# Patient Record
Sex: MALE | Race: WHITE
[De-identification: names, ages, dates, MRNs, and addresses within clinical notes are randomized per-mention and may not be internally consistent; named-entity substitution may affect disease eponyms.]

---

## 2020-07-09 ENCOUNTER — HOSPITAL ENCOUNTER (INPATIENT)
Dept: HOSPITAL 84 - D.ER | Age: 60
LOS: 3 days | Discharge: HOME | DRG: 871 | End: 2020-07-12
Attending: FAMILY MEDICINE | Admitting: FAMILY MEDICINE
Payer: COMMERCIAL

## 2020-07-09 VITALS
WEIGHT: 209.69 LBS | BODY MASS INDEX: 31.06 KG/M2 | WEIGHT: 209.69 LBS | BODY MASS INDEX: 31.06 KG/M2 | HEIGHT: 69 IN | HEIGHT: 69 IN

## 2020-07-09 VITALS — SYSTOLIC BLOOD PRESSURE: 108 MMHG | DIASTOLIC BLOOD PRESSURE: 65 MMHG

## 2020-07-09 VITALS — DIASTOLIC BLOOD PRESSURE: 60 MMHG | SYSTOLIC BLOOD PRESSURE: 110 MMHG

## 2020-07-09 VITALS — DIASTOLIC BLOOD PRESSURE: 74 MMHG | SYSTOLIC BLOOD PRESSURE: 123 MMHG

## 2020-07-09 VITALS — SYSTOLIC BLOOD PRESSURE: 106 MMHG | DIASTOLIC BLOOD PRESSURE: 56 MMHG

## 2020-07-09 VITALS — SYSTOLIC BLOOD PRESSURE: 105 MMHG | DIASTOLIC BLOOD PRESSURE: 87 MMHG

## 2020-07-09 VITALS — DIASTOLIC BLOOD PRESSURE: 85 MMHG | SYSTOLIC BLOOD PRESSURE: 120 MMHG

## 2020-07-09 DIAGNOSIS — K21.9: ICD-10-CM

## 2020-07-09 DIAGNOSIS — D50.9: ICD-10-CM

## 2020-07-09 DIAGNOSIS — A41.9: Primary | ICD-10-CM

## 2020-07-09 DIAGNOSIS — G93.41: ICD-10-CM

## 2020-07-09 DIAGNOSIS — E03.9: ICD-10-CM

## 2020-07-09 DIAGNOSIS — E87.6: ICD-10-CM

## 2020-07-09 DIAGNOSIS — D69.6: ICD-10-CM

## 2020-07-09 DIAGNOSIS — N39.0: ICD-10-CM

## 2020-07-09 DIAGNOSIS — J96.01: ICD-10-CM

## 2020-07-09 DIAGNOSIS — N40.0: ICD-10-CM

## 2020-07-09 DIAGNOSIS — R65.21: ICD-10-CM

## 2020-07-09 DIAGNOSIS — N17.0: ICD-10-CM

## 2020-07-09 DIAGNOSIS — E87.1: ICD-10-CM

## 2020-07-09 LAB
ALBUMIN SERPL-MCNC: 3 G/DL (ref 3.4–5)
ALP SERPL-CCNC: 58 U/L (ref 30–120)
ALT SERPL-CCNC: 89 U/L (ref 10–68)
ANION GAP SERPL CALC-SCNC: 12.6 MMOL/L (ref 8–16)
APTT BLD: 34.2 SECONDS (ref 22.8–39.4)
BACTERIA #/AREA URNS HPF: (no result) /HPF
BASOPHILS NFR BLD AUTO: 0.6 % (ref 0–2)
BILIRUB SERPL-MCNC: 0.6 MG/DL (ref 0.2–1.3)
BILIRUB SERPL-MCNC: NEGATIVE MG/DL
BUN SERPL-MCNC: 29 MG/DL (ref 7–18)
CALCIUM SERPL-MCNC: 8.3 MG/DL (ref 8.5–10.1)
CHLORIDE SERPL-SCNC: 91 MMOL/L (ref 98–107)
CK MB SERPL-MCNC: 2.6 U/L (ref 0–3.6)
CK SERPL-CCNC: 638 UL (ref 21–232)
CO2 SERPL-SCNC: 25.5 MMOL/L (ref 21–32)
CREAT SERPL-MCNC: 2 MG/DL (ref 0.6–1.3)
EOSINOPHIL NFR BLD: 0 % (ref 0–7)
ERYTHROCYTE [DISTWIDTH] IN BLOOD BY AUTOMATED COUNT: 12.8 % (ref 11.5–14.5)
ERYTHROCYTE [DISTWIDTH] IN BLOOD BY AUTOMATED COUNT: 12.9 % (ref 11.5–14.5)
GLOBULIN SER-MCNC: 3.5 G/L
GLUCOSE SERPL-MCNC: 113 MG/DL (ref 74–106)
GLUCOSE SERPL-MCNC: NEGATIVE MG/DL
GRAN CASTS #/AREA URNS LPF: (no result) /LPF
HCT VFR BLD CALC: 33.9 % (ref 42–54)
HCT VFR BLD CALC: 38.4 % (ref 42–54)
HGB BLD-MCNC: 11.9 G/DL (ref 13.5–17.5)
HGB BLD-MCNC: 13.4 G/DL (ref 13.5–17.5)
HYALINE CASTS #/AREA URNS LPF: (no result) /LPF
IMM GRANULOCYTES NFR BLD: 0.8 % (ref 0–5)
INR PPP: 1 (ref 0.85–1.17)
KETONES UR STRIP-MCNC: NEGATIVE MG/DL
LYMPHOCYTES NFR BLD AUTO: 1 % (ref 15–50)
LYMPHOCYTES NFR BLD AUTO: 7.7 % (ref 15–50)
MCH RBC QN AUTO: 31.2 PG (ref 26–34)
MCH RBC QN AUTO: 31.3 PG (ref 26–34)
MCHC RBC AUTO-ENTMCNC: 34.9 G/DL (ref 31–37)
MCHC RBC AUTO-ENTMCNC: 35.1 G/DL (ref 31–37)
MCV RBC: 89.2 FL (ref 80–100)
MCV RBC: 89.5 FL (ref 80–100)
MONOCYTES NFR BLD: 3 % (ref 2–11)
MONOCYTES NFR BLD: 3.9 % (ref 2–11)
NEUTROPHILS NFR BLD AUTO: 85 % (ref 40–80)
NEUTROPHILS NFR BLD AUTO: 87 % (ref 40–80)
NITRITE UR-MCNC: NEGATIVE MG/ML
OSMOLALITY SERPL CALC.SUM OF ELEC: 259 MOSM/KG (ref 275–300)
PH UR STRIP: 5 [PH] (ref 5–6)
PLATELET # BLD EST: (no result) 10*3/UL
PLATELET # BLD: 100 10X3/UL (ref 130–400)
PLATELET # BLD: 119 10X3/UL (ref 130–400)
PMV BLD AUTO: 11.2 FL (ref 7.4–10.4)
PMV BLD AUTO: 11.4 FL (ref 7.4–10.4)
POTASSIUM SERPL-SCNC: 3.1 MMOL/L (ref 3.5–5.1)
PROT SERPL-MCNC: 6.5 G/DL (ref 6.4–8.2)
PROTHROMBIN TIME: 13.1 SECONDS (ref 11.6–15)
RBC # BLD AUTO: 3.8 10X6/UL (ref 4.2–6.1)
RBC # BLD AUTO: 4.29 10X6/UL (ref 4.2–6.1)
RBC #/AREA URNS HPF: (no result) /HPF (ref 0–5)
SODIUM SERPL-SCNC: 126 MMOL/L (ref 136–145)
SP GR UR STRIP: 1.01 (ref 1–1.02)
SQUAMOUS #/AREA URNS HPF: (no result) /HPF (ref 0–5)
TROPONIN I SERPL-MCNC: 0.03 NG/ML (ref 0–0.06)
UROBILINOGEN UR-MCNC: 1 MG/DL
WBC # BLD AUTO: 10.7 10X3/UL (ref 4.8–10.8)
WBC # BLD AUTO: 9.6 10X3/UL (ref 4.8–10.8)
WBC #/AREA URNS HPF: (no result) /HPF

## 2020-07-09 NOTE — NUR
PATIENT RESTING. ON BIPAP. SPO 2 IS 98 AT THIS TIME. TEMPERATURE DROPPED TO
99.5 WITH ICEPACKS THAT I APPLIED AT SHIFT CHANGE. NO ACUTE DISTRESS AT THIS
TIME. DR MICHAEL DID STATE HE COULD HAVE ICE CHIPS AND OCCASSIONAL WATER SIPS
BUT THAT IS IT. HEL BUSPAR SINCE PATIENT WAS ALREADY DROWSY AND SHORT OF
BREATH. MORALES INSERTED AT THIS TIME. WILL CONTINUE TO MONITOR.

## 2020-07-09 NOTE — NUR
PT UP URINATED IN FLOOR.  LINENS CHANGED PT CHANGED.  TEMP 104.  O2 SAT 90
PERCENT.  DR GUEVARA NOTIFIED.  1826 DR MICHAEL CONSULTED.  1900 NEW ORDERS
PLACED IN COMPUTER.  [facility] CALLED TO DR MICHAEL. PT VOICES NO CO AT TIME.

## 2020-07-10 VITALS — DIASTOLIC BLOOD PRESSURE: 90 MMHG | SYSTOLIC BLOOD PRESSURE: 118 MMHG

## 2020-07-10 VITALS — SYSTOLIC BLOOD PRESSURE: 122 MMHG | DIASTOLIC BLOOD PRESSURE: 70 MMHG

## 2020-07-10 VITALS — SYSTOLIC BLOOD PRESSURE: 125 MMHG | DIASTOLIC BLOOD PRESSURE: 74 MMHG

## 2020-07-10 VITALS — DIASTOLIC BLOOD PRESSURE: 86 MMHG | SYSTOLIC BLOOD PRESSURE: 118 MMHG

## 2020-07-10 VITALS — DIASTOLIC BLOOD PRESSURE: 80 MMHG | SYSTOLIC BLOOD PRESSURE: 131 MMHG

## 2020-07-10 VITALS — SYSTOLIC BLOOD PRESSURE: 129 MMHG | DIASTOLIC BLOOD PRESSURE: 69 MMHG

## 2020-07-10 VITALS — DIASTOLIC BLOOD PRESSURE: 98 MMHG | SYSTOLIC BLOOD PRESSURE: 113 MMHG

## 2020-07-10 VITALS — SYSTOLIC BLOOD PRESSURE: 132 MMHG | DIASTOLIC BLOOD PRESSURE: 65 MMHG

## 2020-07-10 VITALS — DIASTOLIC BLOOD PRESSURE: 85 MMHG | SYSTOLIC BLOOD PRESSURE: 130 MMHG

## 2020-07-10 VITALS — DIASTOLIC BLOOD PRESSURE: 79 MMHG | SYSTOLIC BLOOD PRESSURE: 136 MMHG

## 2020-07-10 VITALS — DIASTOLIC BLOOD PRESSURE: 79 MMHG | SYSTOLIC BLOOD PRESSURE: 109 MMHG

## 2020-07-10 VITALS — DIASTOLIC BLOOD PRESSURE: 95 MMHG | SYSTOLIC BLOOD PRESSURE: 128 MMHG

## 2020-07-10 VITALS — SYSTOLIC BLOOD PRESSURE: 107 MMHG | DIASTOLIC BLOOD PRESSURE: 99 MMHG

## 2020-07-10 VITALS — SYSTOLIC BLOOD PRESSURE: 109 MMHG | DIASTOLIC BLOOD PRESSURE: 70 MMHG

## 2020-07-10 VITALS — DIASTOLIC BLOOD PRESSURE: 83 MMHG | SYSTOLIC BLOOD PRESSURE: 135 MMHG

## 2020-07-10 VITALS — SYSTOLIC BLOOD PRESSURE: 131 MMHG | DIASTOLIC BLOOD PRESSURE: 81 MMHG

## 2020-07-10 VITALS — DIASTOLIC BLOOD PRESSURE: 69 MMHG | SYSTOLIC BLOOD PRESSURE: 116 MMHG

## 2020-07-10 VITALS — SYSTOLIC BLOOD PRESSURE: 113 MMHG | DIASTOLIC BLOOD PRESSURE: 75 MMHG

## 2020-07-10 VITALS — SYSTOLIC BLOOD PRESSURE: 111 MMHG | DIASTOLIC BLOOD PRESSURE: 80 MMHG

## 2020-07-10 VITALS — DIASTOLIC BLOOD PRESSURE: 74 MMHG | SYSTOLIC BLOOD PRESSURE: 131 MMHG

## 2020-07-10 VITALS — SYSTOLIC BLOOD PRESSURE: 131 MMHG | DIASTOLIC BLOOD PRESSURE: 69 MMHG

## 2020-07-10 VITALS — DIASTOLIC BLOOD PRESSURE: 61 MMHG | SYSTOLIC BLOOD PRESSURE: 109 MMHG

## 2020-07-10 VITALS — DIASTOLIC BLOOD PRESSURE: 70 MMHG | SYSTOLIC BLOOD PRESSURE: 119 MMHG

## 2020-07-10 VITALS — DIASTOLIC BLOOD PRESSURE: 71 MMHG | SYSTOLIC BLOOD PRESSURE: 116 MMHG

## 2020-07-10 LAB
ALBUMIN SERPL-MCNC: 2.5 G/DL (ref 3.4–5)
ALP SERPL-CCNC: 70 U/L (ref 30–120)
ALT SERPL-CCNC: 76 U/L (ref 10–68)
ANION GAP SERPL CALC-SCNC: 14 MMOL/L (ref 8–16)
APTT BLD: 36.2 SECONDS (ref 22.8–39.4)
BILIRUB SERPL-MCNC: 0.64 MG/DL (ref 0.2–1.3)
BUN SERPL-MCNC: 23 MG/DL (ref 7–18)
CALCIUM SERPL-MCNC: 7.7 MG/DL (ref 8.5–10.1)
CHLORIDE SERPL-SCNC: 98 MMOL/L (ref 98–107)
CO2 SERPL-SCNC: 24.4 MMOL/L (ref 21–32)
CREAT SERPL-MCNC: 1.3 MG/DL (ref 0.6–1.3)
D DIMER PPP FEU-MCNC: 2.93 UG/MLFEU (ref 0.2–0.54)
ERYTHROCYTE [DISTWIDTH] IN BLOOD BY AUTOMATED COUNT: 13.1 % (ref 11.5–14.5)
FIBRINOGEN PPP-MCNC: 486 MG/DL (ref 239–481)
GLOBULIN SER-MCNC: 3.4 G/L
GLUCOSE SERPL-MCNC: 108 MG/DL (ref 74–106)
HCT VFR BLD CALC: 34.9 % (ref 42–54)
HGB BLD-MCNC: 12 G/DL (ref 13.5–17.5)
INR PPP: 1.01 (ref 0.85–1.17)
IRON SERPL-MCNC: 26 UG/DL (ref 35–150)
LYMPHOCYTES NFR BLD AUTO: 5 % (ref 15–50)
MAGNESIUM SERPL-MCNC: 2.9 MG/DL (ref 1.8–2.4)
MCH RBC QN AUTO: 30.8 PG (ref 26–34)
MCHC RBC AUTO-ENTMCNC: 34.4 G/DL (ref 31–37)
MCV RBC: 89.7 FL (ref 80–100)
NEUTROPHILS NFR BLD AUTO: 89 % (ref 40–80)
OSMOLALITY SERPL CALC.SUM OF ELEC: 270 MOSM/KG (ref 275–300)
PHOSPHATE SERPL-MCNC: 3 MG/DL (ref 2.5–4.9)
PLATELET # BLD EST: NORMAL 10*3/UL
PLATELET # BLD: 112 10X3/UL (ref 130–400)
PMV BLD AUTO: 11.7 FL (ref 7.4–10.4)
POTASSIUM SERPL-SCNC: 3.4 MMOL/L (ref 3.5–5.1)
PROT SERPL-MCNC: 5.9 G/DL (ref 6.4–8.2)
PROTHROMBIN TIME: 13.3 SECONDS (ref 11.6–15)
RBC # BLD AUTO: 3.89 10X6/UL (ref 4.2–6.1)
SAO2 % BLD FROM PO2: 14 % (ref 15–55)
SODIUM SERPL-SCNC: 133 MMOL/L (ref 136–145)
TIBC SERPL-MCNC: 181 UG/DL (ref 260–445)
UIBC SERPL-MCNC: 155 UG/DL (ref 150–375)
WBC # BLD AUTO: 10.2 10X3/UL (ref 4.8–10.8)

## 2020-07-10 NOTE — NUR
patient resting. more confused. takes off bp and heart electrodes. attempts to
take off mask but is reoriented.

## 2020-07-10 NOTE — NUR
spoke with lab. deep patient 3 times and could not get access. they stated
they would be over to take care of it.

## 2020-07-10 NOTE — NUR
HS MEDICATIONS RECEIVED SEE MAR FOR ADMINISTRATION - PT DENIES NEEDS AT THIS
TIME. REPOSITIONED FOR COMFORT

## 2020-07-10 NOTE — NUR
FULL SHIFT ASSESSMENT COMPLETED, PATIENT SPEECH IS GARBLED, HE IS ABLE TO
ANSWER ORIENTATION QUESTIONS, EYES PERRL 3 AND BRISK. PATIENT CAN FOLLOW
COMMANDS BILATERAL  STRENGTH EVEN AND EQUAL. PATIENT SMILE IS EVEN. HE HAS
AN OCCASIONAL RIGHT SHOULDER "JERK". HE DENIES PAIN AND NEEDS AT THIS TIME.
SEE FLOWSHEET FOR FULL ASSESSMENT.

## 2020-07-10 NOTE — NUR
REASSESSMENT COMPLETED, SPEECH GARBLED, ABLE TO ANSWER ORIENTATION QUESTIONS
BUT RAMBLES INCOHERENTLY AFTER ANSWERING QUESTIONS. HE IS ORIENTATED X4.
PATIENT IS HICCUPING FREQUENTLY. NO FURTHER CHANGES FROM INITIAL SHIFT
ASSESSMENT, TEMP ELEVATED AT THIS TIME AS DOCUMENTED. CPOC

## 2020-07-10 NOTE — NUR
PATIENT SISTER CALLED IN AT THIS TIME AND PROVIDED PASSWORD FOR UPDATED
INFORMATION ON PATIENT STATUS. UPDATE RECEIVED ALL QUESTIONS ANSWERED

## 2020-07-10 NOTE — NUR
ASSESSMENT COMPLETE PER FLOWSHEET.  BIPAP ON FIO2 50 PERCENT.  PULLING AT MASK
AND MORALES INSTRUCT MUST STOP.  IN ISOLATION FOR PUI.

## 2020-07-11 VITALS — SYSTOLIC BLOOD PRESSURE: 133 MMHG | DIASTOLIC BLOOD PRESSURE: 85 MMHG

## 2020-07-11 VITALS — SYSTOLIC BLOOD PRESSURE: 128 MMHG | DIASTOLIC BLOOD PRESSURE: 73 MMHG

## 2020-07-11 VITALS — DIASTOLIC BLOOD PRESSURE: 75 MMHG | SYSTOLIC BLOOD PRESSURE: 123 MMHG

## 2020-07-11 VITALS — DIASTOLIC BLOOD PRESSURE: 74 MMHG | SYSTOLIC BLOOD PRESSURE: 115 MMHG

## 2020-07-11 VITALS — SYSTOLIC BLOOD PRESSURE: 123 MMHG | DIASTOLIC BLOOD PRESSURE: 70 MMHG

## 2020-07-11 VITALS — DIASTOLIC BLOOD PRESSURE: 84 MMHG | SYSTOLIC BLOOD PRESSURE: 131 MMHG

## 2020-07-11 VITALS — SYSTOLIC BLOOD PRESSURE: 101 MMHG | DIASTOLIC BLOOD PRESSURE: 77 MMHG

## 2020-07-11 VITALS — SYSTOLIC BLOOD PRESSURE: 129 MMHG | DIASTOLIC BLOOD PRESSURE: 81 MMHG

## 2020-07-11 VITALS — SYSTOLIC BLOOD PRESSURE: 112 MMHG | DIASTOLIC BLOOD PRESSURE: 82 MMHG

## 2020-07-11 VITALS — DIASTOLIC BLOOD PRESSURE: 75 MMHG | SYSTOLIC BLOOD PRESSURE: 124 MMHG

## 2020-07-11 VITALS — SYSTOLIC BLOOD PRESSURE: 125 MMHG | DIASTOLIC BLOOD PRESSURE: 83 MMHG

## 2020-07-11 VITALS — SYSTOLIC BLOOD PRESSURE: 125 MMHG | DIASTOLIC BLOOD PRESSURE: 84 MMHG

## 2020-07-11 VITALS — SYSTOLIC BLOOD PRESSURE: 114 MMHG | DIASTOLIC BLOOD PRESSURE: 67 MMHG

## 2020-07-11 VITALS — SYSTOLIC BLOOD PRESSURE: 135 MMHG | DIASTOLIC BLOOD PRESSURE: 79 MMHG

## 2020-07-11 LAB
ALBUMIN SERPL-MCNC: 2.2 G/DL (ref 3.4–5)
ALP SERPL-CCNC: 63 U/L (ref 30–120)
ALT SERPL-CCNC: 98 U/L (ref 10–68)
ANION GAP SERPL CALC-SCNC: 11.5 MMOL/L (ref 8–16)
BILIRUB DIRECT SERPL-MCNC: 0.23 MG/DL (ref 0–0.3)
BILIRUB INDIRECT SERPL-MCNC: 0.36 MG/DL (ref 0–1)
BILIRUB SERPL-MCNC: 0.59 MG/DL (ref 0.2–1.3)
BUN SERPL-MCNC: 22 MG/DL (ref 7–18)
CALCIUM SERPL-MCNC: 7.3 MG/DL (ref 8.5–10.1)
CHLORIDE SERPL-SCNC: 102 MMOL/L (ref 98–107)
CK MB SERPL-MCNC: 1.2 U/L (ref 0–3.6)
CK SERPL-CCNC: 400 UL (ref 21–232)
CO2 SERPL-SCNC: 25 MMOL/L (ref 21–32)
CREAT SERPL-MCNC: 0.9 MG/DL (ref 0.6–1.3)
ERYTHROCYTE [DISTWIDTH] IN BLOOD BY AUTOMATED COUNT: 13.4 % (ref 11.5–14.5)
GLOBULIN SER-MCNC: 3.1 G/L
GLUCOSE SERPL-MCNC: 104 MG/DL (ref 74–106)
HCT VFR BLD CALC: 30 % (ref 42–54)
HCV AB S/CO SERPL IA: 0.4 (ref 0–0.9)
HGB BLD-MCNC: 10.2 G/DL (ref 13.5–17.5)
LYMPHOCYTES NFR BLD AUTO: 30 % (ref 15–50)
MCH RBC QN AUTO: 30.9 PG (ref 26–34)
MCHC RBC AUTO-ENTMCNC: 34 G/DL (ref 31–37)
MCV RBC: 90.9 FL (ref 80–100)
NEUTROPHILS NFR BLD AUTO: 64 % (ref 40–80)
OSMOLALITY SERPL CALC.SUM OF ELEC: 272 MOSM/KG (ref 275–300)
PLATELET # BLD EST: NORMAL 10*3/UL
PLATELET # BLD: 159 10X3/UL (ref 130–400)
PMV BLD AUTO: 11.8 FL (ref 7.4–10.4)
POTASSIUM SERPL-SCNC: 3.5 MMOL/L (ref 3.5–5.1)
PROT SERPL-MCNC: 5.3 G/DL (ref 6.4–8.2)
RBC # BLD AUTO: 3.3 10X6/UL (ref 4.2–6.1)
SODIUM SERPL-SCNC: 135 MMOL/L (ref 136–145)
WBC # BLD AUTO: 9.1 10X3/UL (ref 4.8–10.8)

## 2020-07-11 NOTE — NUR
PER DR GUEVARA, GISELLA MORALES AND TRANSFER PT TO FLOOR. PHYSICIAN ALSO STATED HE
WOULD PLACE ORDER FOR PTS HICCUPS.

## 2020-07-11 NOTE — NUR
STAFF AT BEDSIDE FOR BILAT LE DOPPLER, PATIENT ANXIOUS AND CONFUSED TO TIME
AND SITUATION.ATTEMPTING TO PULL ON MEDICAL EQUIPMENT. SUCCESFUL ATTEMPT AT
REORIENTATION. PT CALMED AND DOPPLER STUDY CONTINUED. VSS CPOC

## 2020-07-11 NOTE — NUR
RECIEVED UP IN BED WITH SPOUSE AT BEDSIDE. ALERT AND ORIENTED WITH [id number] OF
CONFUSION. WHEN ASKED A QUESTION BECOME FLUSTERED AND SPEECH BECOMES GARBLED.
IV TO RT AC WITH VIT BAG INFUSING. STARTED IV TO RT HAND FOR ABT. UP AD BARTOLOME TO
B/R. DENIES ANY NEEDS AT THIS TIME.

## 2020-07-11 NOTE — NUR
MORALES DCD AT THIS TIME PER ORDERS, CATHETER TIP INTACT. URINAL PROVIDED TO PT.
VSS. WILL CONTINUE PLAN OF CARE.

## 2020-07-11 NOTE — NUR
NOT CURRENTLY ATTEMPTING TO PULL AT LINES OR TUBES. RESTRAINTS REMOVED AT THIS
TIME. PT STATES UNDERSTANDING TO NOT PULL AT LINES OR TUBES AND STATES HE CAN
TELL THAT HE HAD PREVIOUSLY BEEN MORE CONFUSED THAN NOW AND THAT HE WILL NOT
ATTEMPT TO PULL AT LINES OR TUBES. VSS. WILL CONTINUE PLAN OF CARE.

## 2020-07-11 NOTE — NUR
SITTING UP IN BED AWAKE AT THIS TIME. VSS. NO ACUTE DISTRESS NOTED. CALL LIGHT
IN REACH. WILL CONTINUE PLAN OF CARE.

## 2020-07-11 NOTE — NUR
PATIENT RESTING COMFORTABLY. EVEN RISE AND FALL OF CHEST. CALL LIGHT IN REACH.
NO ACUTE CHANGES OR SIGNS OF DISTRESS. VSS CPOC

## 2020-07-11 NOTE — NUR
TRANSFERRED TO ROOM 2103 AT THIS TIME VIA BED WITH ALL PERSONAL ITEMS
INCLUDING HOME CPAP.
ACCOMPANIED BY HOSPITAL STAFF. VSS. NO ACUTE DISTRESS NOTED. NO FURTHER
ACTIONS.

## 2020-07-11 NOTE — NUR
REPORT CALLED TO RECIEVING NURSE WHO STATED ROOM FOR PT TO GO TO IS STILL
BEING CLEANED BUT SHOULD BE READY SHORTLY. WILL TRANSFER PT TO ROOM 2103 WHEN
ROOM IS READY.

## 2020-07-12 VITALS — SYSTOLIC BLOOD PRESSURE: 139 MMHG | DIASTOLIC BLOOD PRESSURE: 79 MMHG

## 2020-07-12 VITALS — SYSTOLIC BLOOD PRESSURE: 143 MMHG | DIASTOLIC BLOOD PRESSURE: 78 MMHG

## 2020-07-12 LAB
ANION GAP SERPL CALC-SCNC: 14.1 MMOL/L (ref 8–16)
BUN SERPL-MCNC: 12 MG/DL (ref 7–18)
CALCIUM SERPL-MCNC: 7.5 MG/DL (ref 8.5–10.1)
CHLORIDE SERPL-SCNC: 102 MMOL/L (ref 98–107)
CO2 SERPL-SCNC: 22.2 MMOL/L (ref 21–32)
CREAT SERPL-MCNC: 0.7 MG/DL (ref 0.6–1.3)
ERYTHROCYTE [DISTWIDTH] IN BLOOD BY AUTOMATED COUNT: 13.8 % (ref 11.5–14.5)
GLUCOSE SERPL-MCNC: 99 MG/DL (ref 74–106)
HCT VFR BLD CALC: 31.6 % (ref 42–54)
HGB BLD-MCNC: 10.9 G/DL (ref 13.5–17.5)
LYMPHOCYTES NFR BLD AUTO: 49.6 % (ref 15–50)
MAGNESIUM SERPL-MCNC: 2.9 MG/DL (ref 1.8–2.4)
MCH RBC QN AUTO: 31.7 PG (ref 26–34)
MCHC RBC AUTO-ENTMCNC: 34.5 G/DL (ref 31–37)
MCV RBC: 91.9 FL (ref 80–100)
NEUTROPHILS NFR BLD AUTO: 38.9 % (ref 40–80)
OSMOLALITY SERPL CALC.SUM OF ELEC: 269 MOSM/KG (ref 275–300)
PLATELET # BLD: 172 10X3/UL (ref 130–400)
PMV BLD AUTO: 10.9 FL (ref 7.4–10.4)
POTASSIUM SERPL-SCNC: 3.3 MMOL/L (ref 3.5–5.1)
RBC # BLD AUTO: 3.44 10X6/UL (ref 4.2–6.1)
SODIUM SERPL-SCNC: 135 MMOL/L (ref 136–145)
WBC # BLD AUTO: 9.1 10X3/UL (ref 4.8–10.8)

## 2020-07-12 NOTE — MORECARE
CASE MANAGEMENT DISCHARGE SUMMARY
 
 
PATIENT: MARIEL SOTELO                  UNIT: [id number]
ACCOUNT#: [id number]                       ADM DATE: 20
AGE: 60     : 58  SEX: M            ROOM/BED: D.2103    
AUTHOR: ALLYSON LERMA                             PHYSICIAN:                               
 
REFERRING PHYSICIAN: SHANTE GUEVARA MD           
DATE OF SERVICE: 20
Discharge Plan
 
 
Patient Name: MARIEL SOTELO
Facility: [facility]:[place]
Encounter #: [id number]
Medical Record #: [id number]
: 1960
Planned Disposition: Home
Anticipated Discharge Date: 20
 
Discharge Date: 2020
Expected LOS: 3
Initial Reviewer: [id number]
Initial Review Date: 2020
Generated: 20   7:07 pm 
Comments
 
DCP- Discharge Planning
 
Updated by [id number]: Sheryl Blankenship on 20   4:10 pm CT
Patient Name: MARIEL SOTELO                                     
Admission Status: ER   
Accout number: [id number]                              
Admission Date: 2020   
: 1960                                                        
Admission Diagnosis:   
Attending: SHANTE GUEVARA                                                
Current LOS:  3   
  
Anticipated DC Date: 2020   
Planned Disposition: Home   
Primary Insurance: [employer]   
  
  
Discharge Planning Comments: CM met with patient to complete initial dc 
planning assessment.  CM educated patient on the CM role and verbal consent 
given by patient to complete assessment. Patient lives at home with family. 
Patient is independent. At discharge patient plans to return home and feels 
this is a safe discharge.  CM discussed availability of home health, rehab 
 
services, and medical equipment. Patient will have family to transport home. 
Patient denied known discharge needs at this time. CM will continue to follow 
and will assist as needed with dc plans/needs.     
  
  
  
  
  
  
: Sheryl Blankenship
 DCPIA - Discharge Planning Initial Assessment
 
Updated by [id number]: Sheryl Blankenship on 20   5:09 pm
*  Is the patient Alert and Oriented?
Yes
*  How many steps to enter\exit or inside your home?
 
*  PCP
[facility]
*  [facility]
*  Preadmission Environment
Home with Family
*  ADLs
Independent
*  Equipment
CPAP
*  List name and contact numbers for known caregivers / representatives who 
currently or will assist patient after discharge:
MARY SOTELO - [facility]- [phone]
*  Verbal permission to speak to the caregivers and representatives has been 
obtained from the patient.
Yes
*  Community resources currently utilized
None
*  Additional services required to return to the preadmission environment?
No
*  Can the patient safely return to the preadmission environment?
Yes
*  Has this patient been hospitalized within the prior 30 days at any 
hospital?
No
 
 
 
 
 
 
 
Last DP export: 20   4:12 p
Patient Name: MARIEL SOTELO
 
Encounter #: [id number]
Page [id number]
 
 
 
 
 
Electronically Signed by ALLYSON LERMA on 20 at 1807
 
 
 
 
 
 
**All edits/amendments must be made on the electronic document**
 
DICTATION DATE: 20     : JANIYA  20     
RPT#: [id number]                                DC DATE:20
                                               STATUS: DIS IN  
[facility]
 [place] [address]
***END OF REPORT***

## 2020-07-12 NOTE — MORECARE
CASE MANAGEMENT DISCHARGE SUMMARY
 
 
PATIENT: MARIEL SOTELO                  UNIT: [id number]
ACCOUNT#: [id number]                       ADM DATE: 20
AGE: 60     : 58  SEX: M            ROOM/BED: D.2103    
AUTHOR: ALLYSON LERMA                             PHYSICIAN:                               
 
REFERRING PHYSICIAN: SHANTE GUEVARA MD           
DATE OF SERVICE: 20
Discharge Plan
 
 
Patient Name: MARIEL SOTELO
Facility: [facility]:[place]
Encounter #: [id number]
Medical Record #: [id number]
: 1960
Planned Disposition: Home
Anticipated Discharge Date: 20
 
Discharge Date: 2020
Expected LOS: 3
Initial Reviewer: [id number]
Initial Review Date: 2020
Generated: 20   6:11 pm 
Comments
 
DCP- Discharge Planning
 
Updated by [id number]: Sheryl Blankenship on 20   4:10 pm CT
Patient Name: MARIEL SOTELO                                     
Admission Status: ER   
Accout number: [id number]                              
Admission Date: 2020   
: 1960                                                        
Admission Diagnosis:   
Attending: SHANTE GUEVARA                                                
Current LOS:  3   
  
Anticipated DC Date: 2020   
Planned Disposition: Home   
Primary Insurance: [employer]   
  
  
Discharge Planning Comments: CM met with patient to complete initial dc 
planning assessment.  CM educated patient on the CM role and verbal consent 
given by patient to complete assessment. Patient lives at home with family. 
Patient is independent. At discharge patient plans to return home and feels 
this is a safe discharge.  CM discussed availability of home health, rehab 
 
services, and medical equipment. Patient will have family to transport home. 
Patient denied known discharge needs at this time. CM will continue to follow 
and will assist as needed with dc plans/needs.     
  
  
  
  
  
  
: Sheryl Blankenship
 DCPIA - Discharge Planning Initial Assessment
 
Updated by [id number]: Sheryl Blankenship on 20   5:09 pm
*  Is the patient Alert and Oriented?
Yes
*  How many steps to enter\exit or inside your home?
 
*  PCP
[facility]
*  Ph[facility]
*  Preadmission Environment
Home with Family
*  ADLs
Independent
*  Equipment
CPAP
*  List name and contact numbers for known caregivers / representatives who 
currently or will assist patient after discharge:
MARY SOTELO - [facility]- [phone]
*  Verbal permission to speak to the caregivers and representatives has been 
obtained from the patient.
Yes
*  Community resources currently utilized
None
*  Additional services required to return to the preadmission environment?
No
*  Can the patient safely return to the preadmission environment?
Yes
*  Has this patient been hospitalized within the prior 30 days at any 
hospital?
No
 
 
 
 
 
 
Patient Name: MARIEL SOTELO
 
Encounter #: [id number]
Page [phone]
 
 
 
 
 
Electronically Signed by ALLYSON LERMA on 20 at 1712
 
 
 
 
 
 
**All edits/amendments must be made on the electronic document**
 
DICTATION DATE: 20     : JANIYA  20     
RPT#: [id number]                                DC DATE:20
                                               STATUS: DIS IN  
[facility]
[address] [place] [address]
***END OF REPORT***

## 2020-07-13 LAB — PROCALCITONIN SERPL-MCNC: 0.95 NG/ML (ref 0–0.08)

## 2020-07-14 LAB
E CHAFFEENSIS IGG TITR SER IF: (no result) {TITER}
E CHAFFEENSIS IGM TITR SER: (no result) {TITER}
R RICKETTSI IGM SER QL IA: 0.24 INDEX (ref 0–0.89)